# Patient Record
Sex: FEMALE | Race: WHITE
[De-identification: names, ages, dates, MRNs, and addresses within clinical notes are randomized per-mention and may not be internally consistent; named-entity substitution may affect disease eponyms.]

---

## 2019-12-31 ENCOUNTER — HOSPITAL ENCOUNTER (EMERGENCY)
Dept: HOSPITAL 74 - JERFT | Age: 59
Discharge: HOME | End: 2019-12-31
Payer: COMMERCIAL

## 2019-12-31 VITALS — DIASTOLIC BLOOD PRESSURE: 77 MMHG | HEART RATE: 89 BPM | TEMPERATURE: 97.8 F | SYSTOLIC BLOOD PRESSURE: 142 MMHG

## 2019-12-31 VITALS — BODY MASS INDEX: 27.3 KG/M2

## 2019-12-31 DIAGNOSIS — E07.9: ICD-10-CM

## 2019-12-31 DIAGNOSIS — Z48.00: Primary | ICD-10-CM

## 2019-12-31 DIAGNOSIS — I10: ICD-10-CM

## 2019-12-31 PROCEDURE — 3E0234Z INTRODUCTION OF SERUM, TOXOID AND VACCINE INTO MUSCLE, PERCUTANEOUS APPROACH: ICD-10-PCS | Performed by: EMERGENCY MEDICINE

## 2019-12-31 NOTE — PDOC
History of Present Illness





- General


Chief Complaint: Wound


Stated Complaint: RT SIDE FOOT PAIN/ ANKLE


Time Seen by Provider: 12/31/19 12:52





- History of Present Illness


Initial Comments: 





12/31/19 13:01


59-year-old female without comorbidities presents for evaluation of left foot 

pain.  10 days ago she fell and scraped her foot on a concrete stone causing an 

abrasion she is here requesting a work note till Monday and for a wound check 

she is not current on tetanus.





Past History





- Past Medical History


Allergies/Adverse Reactions: 


 Allergies











Allergy/AdvReac Type Severity Reaction Status Date / Time


 


No Known Allergies Allergy   Verified 12/31/19 12:52











Home Medications: 


Ambulatory Orders





Pantoprazole Sodium [Protonix -] 40 mg PO DAILY 08/16/13 


Carbamazepine [Tegretol -] 100 mg PO BID #20 tab.chew 12/28/14 


Hydrochlorothiazide [Hctz -] 25 mg PO DAILY 09/07/15 


Cyclobenzaprine HCl 10 mg PO Q8H PRN #14 tablet 11/25/18 


Ibuprofen [Motrin -] 600 mg PO TID PRN 11/25/18 


Naproxen [Naprosyn -] 500 mg PO BID #30 tablet 11/25/18 








Anemia: No


COPD: No


Diabetes: No


GI Disorders: Yes (BACTERIA)


HTN: Yes


Hypercholesterolemia: Yes


Thyroid Disease: Yes





- Surgical History


Abdominal Surgery: Yes (TUBAL LIGATION)





- Immunization History


Td Vaccination: Yes


TDAP Vaccination: Yes


Immunization Up to Date: Yes





- Psycho Social/Smoking Cessation Hx


Smoking Status: No


Smoking History: Never smoked


Have you smoked in the past 12 months: No


Number of Cigarettes Smoked Daily: 0


Hx Alcohol Use: No


Drug/Substance Use Hx: No


Substance Use Type: None





**Review of Systems





- Review of Systems


Musculoskeletal: Yes: See HPI





*Physical Exam





- Vital Signs


 Last Vital Signs











Temp Pulse Resp BP Pulse Ox


 


 97.8 F   89   18   142/77   99 


 


 12/31/19 12:46  12/31/19 12:46  12/31/19 12:46  12/31/19 12:46  12/31/19 12:46














- Physical Exam





12/31/19 13:02


There is a superficial abrasion with formed eschar on the medial aspect of the 

left foot on the skin overlying the first MTPJ as well as the skin over the 

navicular.  There are no gross sensorimotor deficits neurovascularly intact 

normal surrounding skin color and temperature without indication of secondary 

infection.





Medical Decision Making





- Medical Decision Making





12/31/19 13:02


No infection in this wound.  Tetanus was updated.  Weight-bear as tolerated.  

Work note provided.  No p.o. antibiotics required.  Discussed soap and water 

for wound care leaving the area open to air as much as possible.





Discharge





- Discharge Information


Problems reviewed: Yes


Clinical Impression/Diagnosis: 


 Visit for wound check





Condition: Stable


Disposition: HOME





- Admission


No





- Follow up/Referral


Referrals: 


Chase Winslow MD [Staff Physician] - 





- Patient Discharge Instructions


Additional Instructions: 


Please keep the wound clean and dry with normal soap and water.  Please leave 

the area open to air as much as possible.  If you leave the house and need to 

work or be active you may cover it with a dry sterile dressing such as a large 

Band-Aid.  Return to the emergency room for worsening symptoms.  Without fail 

follow-up with primary care physician in 1 to 2 days for further evaluation and 

treatment options.





- Post Discharge Activity


Work/Back to School Note:  Back to Work